# Patient Record
Sex: FEMALE | Race: BLACK OR AFRICAN AMERICAN
[De-identification: names, ages, dates, MRNs, and addresses within clinical notes are randomized per-mention and may not be internally consistent; named-entity substitution may affect disease eponyms.]

---

## 2020-02-11 ENCOUNTER — HOSPITAL ENCOUNTER (EMERGENCY)
Dept: HOSPITAL 74 - FER | Age: 45
Discharge: HOME | End: 2020-02-11
Payer: COMMERCIAL

## 2020-02-11 VITALS — DIASTOLIC BLOOD PRESSURE: 75 MMHG | TEMPERATURE: 98.5 F | HEART RATE: 84 BPM | SYSTOLIC BLOOD PRESSURE: 98 MMHG

## 2020-02-11 VITALS — BODY MASS INDEX: 21.6 KG/M2

## 2020-02-11 DIAGNOSIS — S46.819A: Primary | ICD-10-CM
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
## 2020-02-11 DIAGNOSIS — F17.210: ICD-10-CM
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
## 2020-02-11 DIAGNOSIS — M06.9: ICD-10-CM
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
